# Patient Record
Sex: MALE | Race: WHITE | NOT HISPANIC OR LATINO | Employment: FULL TIME
[De-identification: names, ages, dates, MRNs, and addresses within clinical notes are randomized per-mention and may not be internally consistent; named-entity substitution may affect disease eponyms.]

---
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
## 2020-12-23 ENCOUNTER — IMMUNIZATION (OUTPATIENT)
Dept: OCCUPATIONAL MEDICINE | Age: 20
End: 2020-12-23

## 2020-12-23 DIAGNOSIS — Z23 NEED FOR VACCINATION: Primary | ICD-10-CM

## 2020-12-23 PROCEDURE — 0001A COVID 19 PFIZER-BIONTECH: CPT

## 2020-12-23 PROCEDURE — 91300 COVID 19 PFIZER-BIONTECH: CPT

## 2021-01-11 ENCOUNTER — IMMUNIZATION (OUTPATIENT)
Dept: OCCUPATIONAL MEDICINE | Age: 21
End: 2021-01-11

## 2021-01-11 DIAGNOSIS — Z23 NEED FOR VACCINATION: Primary | ICD-10-CM

## 2021-01-11 PROCEDURE — 0002A COVID 19 PFIZER-BIONTECH: CPT

## 2021-01-11 PROCEDURE — 91300 COVID 19 PFIZER-BIONTECH: CPT

## 2021-12-28 ENCOUNTER — HOSPITAL ENCOUNTER (EMERGENCY)
Age: 21
Discharge: HOME | End: 2021-12-28
Attending: EMERGENCY MEDICINE

## 2021-12-28 VITALS
RESPIRATION RATE: 18 BRPM | SYSTOLIC BLOOD PRESSURE: 139 MMHG | HEART RATE: 100 BPM | WEIGHT: 200 LBS | HEIGHT: 73 IN | TEMPERATURE: 99.5 F | DIASTOLIC BLOOD PRESSURE: 83 MMHG | OXYGEN SATURATION: 100 % | BODY MASS INDEX: 26.51 KG/M2

## 2021-12-28 DIAGNOSIS — T78.40XA ALLERGIC REACTION, INITIAL ENCOUNTER: Primary | ICD-10-CM

## 2021-12-28 PROCEDURE — 99282 EMERGENCY DEPT VISIT SF MDM: CPT

## 2021-12-28 RX ORDER — PREDNISONE 20 MG/1
40 TABLET ORAL DAILY
Qty: 10 TABLET | Refills: 0 | Status: SHIPPED | OUTPATIENT
Start: 2021-12-28

## 2021-12-28 ASSESSMENT — ENCOUNTER SYMPTOMS
SHORTNESS OF BREATH: 0
DIZZINESS: 0
HEADACHES: 0
PSYCHIATRIC NEGATIVE: 1
ABDOMINAL PAIN: 0
DIARRHEA: 0
COUGH: 0
NAUSEA: 0
HEMATOLOGIC/LYMPHATIC NEGATIVE: 1
VOMITING: 0
WEAKNESS: 0
EYES NEGATIVE: 1
ENDOCRINE NEGATIVE: 1
SORE THROAT: 1
FEVER: 0

## 2021-12-28 ASSESSMENT — PAIN SCALES - GENERAL: PAINLEVEL_OUTOF10: 0

## 2021-12-29 ENCOUNTER — TELEPHONE (OUTPATIENT)
Dept: FAMILY MEDICINE | Age: 21
End: 2021-12-29

## 2022-11-17 ENCOUNTER — WALK IN (OUTPATIENT)
Dept: URGENT CARE | Age: 22
End: 2022-11-17
Attending: FAMILY MEDICINE

## 2022-11-17 VITALS
DIASTOLIC BLOOD PRESSURE: 93 MMHG | HEART RATE: 102 BPM | TEMPERATURE: 98.8 F | OXYGEN SATURATION: 97 % | BODY MASS INDEX: 26.51 KG/M2 | HEIGHT: 73 IN | SYSTOLIC BLOOD PRESSURE: 148 MMHG | WEIGHT: 200 LBS | RESPIRATION RATE: 16 BRPM

## 2022-11-17 DIAGNOSIS — J10.1 INFLUENZA A: Primary | ICD-10-CM

## 2022-11-17 LAB
FLUAV AG UPPER RESP QL IA.RAPID: POSITIVE
FLUBV AG UPPER RESP QL IA.RAPID: NEGATIVE
INTERNAL PROCEDURAL CONTROLS ACCEPTABLE: YES

## 2022-11-17 PROCEDURE — 87804 INFLUENZA ASSAY W/OPTIC: CPT | Performed by: PHYSICIAN ASSISTANT

## 2022-11-17 PROCEDURE — 99212 OFFICE O/P EST SF 10 MIN: CPT

## 2022-11-17 RX ORDER — OSELTAMIVIR PHOSPHATE 75 MG/1
75 CAPSULE ORAL 2 TIMES DAILY
Qty: 10 CAPSULE | Refills: 0 | Status: SHIPPED | OUTPATIENT
Start: 2022-11-17 | End: 2022-11-22

## 2022-11-17 ASSESSMENT — ENCOUNTER SYMPTOMS
VOMITING: 0
WHEEZING: 0
SORE THROAT: 0
FEVER: 1
CHILLS: 0
COUGH: 1
HEADACHES: 0
DIARRHEA: 0
SHORTNESS OF BREATH: 0
NAUSEA: 0
ABDOMINAL PAIN: 0

## 2022-11-17 ASSESSMENT — PAIN SCALES - GENERAL: PAINLEVEL: 0

## 2024-11-17 ENCOUNTER — HOSPITAL ENCOUNTER (OUTPATIENT)
Age: 24
Discharge: HOME | End: 2024-11-17
Payer: COMMERCIAL

## 2024-11-17 ENCOUNTER — APPOINTMENT (OUTPATIENT)
Dept: GENERAL RADIOLOGY | Age: 24
End: 2024-11-17
Attending: PHYSICIAN ASSISTANT
Payer: COMMERCIAL

## 2024-11-17 VITALS
SYSTOLIC BLOOD PRESSURE: 142 MMHG | TEMPERATURE: 98 F | OXYGEN SATURATION: 99 % | RESPIRATION RATE: 15 BRPM | DIASTOLIC BLOOD PRESSURE: 75 MMHG | HEART RATE: 74 BPM

## 2024-11-17 DIAGNOSIS — R05.9 COUGH: Primary | ICD-10-CM

## 2024-11-17 PROCEDURE — 99203 OFFICE O/P NEW LOW 30 MIN: CPT | Performed by: PHYSICIAN ASSISTANT

## 2024-11-17 PROCEDURE — 71046 X-RAY EXAM CHEST 2 VIEWS: CPT | Performed by: PHYSICIAN ASSISTANT

## 2024-11-17 RX ORDER — ALBUTEROL SULFATE 90 UG/1
2 INHALANT RESPIRATORY (INHALATION) EVERY 4 HOURS PRN
Qty: 1 EACH | Refills: 0 | Status: SHIPPED | OUTPATIENT
Start: 2024-11-17 | End: 2024-11-18

## 2024-11-17 RX ORDER — BENZONATATE 100 MG/1
100 CAPSULE ORAL 3 TIMES DAILY PRN
Qty: 21 CAPSULE | Refills: 0 | Status: SHIPPED | OUTPATIENT
Start: 2024-11-17 | End: 2024-11-18

## 2024-11-17 NOTE — ED INITIAL ASSESSMENT (HPI)
Patient reports cough and chest congestion over the last 2 weeks. Denies fever. Denies sick contacts.

## 2024-11-17 NOTE — ED PROVIDER NOTES
Patient Seen in: [facility]      History     Chief Complaint   Patient presents with    Cough     Heaviness in the chest, red mucus - Entered by patient     Stated Complaint: Cough - Heaviness in the chest, red mucus    Subjective:   HPI    24-year-old male presenting for evaluation of URI symptoms for the last 2 weeks.  Patient endorsing cough, chest congestion.  Has noted blood-streaked sputum at times.  He has no complaint of chest pain or shortness of breath.  No known sick contacts.    Objective:     History reviewed. No pertinent past medical history.           History reviewed. No pertinent surgical history.             Social History     Socioeconomic History    Marital status:    Tobacco Use    Smoking status: Never    Smokeless tobacco: Never   Vaping Use    Vaping status: Never Used   Substance and Sexual Activity    Alcohol use: Never    Drug use: Never              Review of Systems    Positive for stated complaint: Cough - Heaviness in the chest, red mucus  Other systems are as noted in HPI.  Constitutional and vital signs reviewed.      All other systems reviewed and negative except as noted above.    Physical Exam     ED Triage Vitals [11/17/24 1125]   /75   Pulse 74   Resp 15   Temp 98.2 °F (36.8 °C)   Temp src Oral   SpO2 99 %   O2 Device None (Room air)       Current Vitals:   Vital Signs  BP: 142/75  Pulse: 74  Resp: 15  Temp: 98.2 °F (36.8 °C)  Temp src: Oral    Oxygen Therapy  SpO2: 99 %  O2 Device: None (Room air)        Physical Exam  Vitals and nursing note reviewed.   Constitutional:       General: He is not in acute distress.  HENT:      Head: Normocephalic and atraumatic.      Right Ear: External ear normal.      Left Ear: External ear normal.      Nose: Nose normal.      Mouth/Throat:      Mouth: Mucous membranes are moist.   Eyes:      Extraocular Movements: Extraocular movements intact.      Pupils: Pupils are equal, round, and reactive to light.    Cardiovascular:      Rate and Rhythm: Normal rate.   Pulmonary:      Effort: Pulmonary effort is normal.      Breath sounds: No wheezing or rhonchi.   Abdominal:      General: Abdomen is flat.   Musculoskeletal:         General: Normal range of motion.      Cervical back: Normal range of motion.   Skin:     General: Skin is warm.   Neurological:      General: No focal deficit present.      Mental Status: He is alert and oriented to person, place, and time.   Psychiatric:         Mood and Affect: Mood normal.         Behavior: Behavior normal.             ED Course   Labs Reviewed - No data to display      23 yo male here with c/o cough, congestion x 3 weeks. Pt afebrile.  No hypoxia, no tachycardia.  Ddx-postnasal drip, bronchitis, pneumonia  Chest x-ray is negative for infiltrate or abnormality    Patient has had improvement in symptoms before with albuterol, Tessalon.  Will Rx the same.  Supportive care, anticipatory guidance and return precautions reviewed           MDM              Medical Decision Making      Disposition and Plan     Clinical Impression:  1. Cough         Disposition:  Discharge  11/17/2024 12:35 pm    Follow-up:  No follow-up provider specified.        Medications Prescribed:  Discharge Medication List as of 11/17/2024 12:39 PM        START taking these medications    Details   albuterol 108 (90 Base) MCG/ACT Inhalation Aero Soln Inhale 2 puffs into the lungs every 4 (four) hours as needed for Wheezing., Normal, Disp-1 each, R-0      benzonatate 100 MG Oral Cap Take 1 capsule (100 mg total) by mouth 3 (three) times daily as needed for cough., Normal, Disp-21 capsule, R-0                 Supplementary Documentation:

## 2024-11-18 RX ORDER — ALBUTEROL SULFATE 90 UG/1
2 INHALANT RESPIRATORY (INHALATION) EVERY 4 HOURS PRN
Qty: 1 EACH | Refills: 0 | Status: SHIPPED | OUTPATIENT
Start: 2024-11-18 | End: 2024-12-18

## 2024-11-18 RX ORDER — BENZONATATE 100 MG/1
100 CAPSULE ORAL 3 TIMES DAILY PRN
Qty: 21 CAPSULE | Refills: 0 | Status: SHIPPED | OUTPATIENT
Start: 2024-11-18 | End: 2024-11-25